# Patient Record
Sex: FEMALE | Race: WHITE | Employment: FULL TIME | URBAN - METROPOLITAN AREA
[De-identification: names, ages, dates, MRNs, and addresses within clinical notes are randomized per-mention and may not be internally consistent; named-entity substitution may affect disease eponyms.]

---

## 2022-11-01 ENCOUNTER — OFFICE VISIT (OUTPATIENT)
Dept: PRIMARY CARE CLINIC | Age: 62
End: 2022-11-01
Payer: COMMERCIAL

## 2022-11-01 VITALS
WEIGHT: 135.8 LBS | OXYGEN SATURATION: 98 % | SYSTOLIC BLOOD PRESSURE: 110 MMHG | HEART RATE: 77 BPM | DIASTOLIC BLOOD PRESSURE: 72 MMHG | TEMPERATURE: 98.7 F

## 2022-11-01 DIAGNOSIS — H61.22 IMPACTED CERUMEN OF LEFT EAR: Primary | ICD-10-CM

## 2022-11-01 PROCEDURE — 99213 OFFICE O/P EST LOW 20 MIN: CPT | Performed by: NURSE PRACTITIONER

## 2022-11-01 ASSESSMENT — PATIENT HEALTH QUESTIONNAIRE - PHQ9
SUM OF ALL RESPONSES TO PHQ QUESTIONS 1-9: 0
SUM OF ALL RESPONSES TO PHQ QUESTIONS 1-9: 0
2. FEELING DOWN, DEPRESSED OR HOPELESS: 0
SUM OF ALL RESPONSES TO PHQ QUESTIONS 1-9: 0
SUM OF ALL RESPONSES TO PHQ QUESTIONS 1-9: 0
SUM OF ALL RESPONSES TO PHQ9 QUESTIONS 1 & 2: 0
1. LITTLE INTEREST OR PLEASURE IN DOING THINGS: 0

## 2022-11-01 NOTE — PROGRESS NOTES
Chief Complaint:   Otalgia (Left ear pain, feels like fluid in ear x 2 weeks )      History of Present Illness   Source of history provided by:  patient. Miles Amaro is a 60 y.o. old female presenting to the [facility] for evaluation of plugged sensation in the left ear x 2 weeks. Denies having nasal congestion, rhinorrhea or sore throat. Denies any discharge from the ear canal. Has tried taking Nyquil OTC, without relief. Denies any fever, chills, CP, SOB, abdominal pain, neck stiffness, rash, or lethargy. Review of Systems   Unless otherwise stated in this report or unable to obtain because of the patient's clinical or mental status as evidenced by the medical record, this patients's positive and negative responses for Review of Systems, constitutional, psych, eyes, ENT, cardiovascular, respiratory, gastrointestinal, neurological, genitourinary, musculoskeletal, integument systems and systems related to the presenting problem are either stated in the preceding or were not pertinent or were negative for the symptoms and/or complaints related to the medical problem. Past Medical History:  has no past medical history on file. Past Surgical History:  has no past surgical history on file. Social History:  reports that she has never smoked. She has never used smokeless tobacco.  Family History: family history is not on file. Allergies: Patient has no known allergies. Physical Exam   Vital Signs:  /72 (Site: Left Upper Arm, Position: Sitting, Cuff Size: Large Adult)   Pulse 77   Temp 98.7 °F (37.1 °C) (Temporal)   Wt 135 lb 12.8 oz (61.6 kg)   SpO2 98%    Oxygen Saturation Interpretation: Normal.    Constitutional:  Alert, development consistent with age. Ears:  External Ears: Normal pinna bilaterally. TM's & External Canals: Right TM and canal are unremarkable. Left TM blocked with cerumen. Nose:  Minimal congestion of the nasal mucosa.   Throat:  Posterior pharynx without injection, exudate, or tonsillar hypertrophy. Airway patient. Neck:  Normal ROM. Supple. No adenopathy. Respiratory:  CTAB without wheezing, rales, or rhonchi  CV: Regular rate and rhythm, normal heart sounds, without pathological murmurs, ectopy, gallops, or rubs. Skin:  Moist and warm without rashes or lesions. Lymphatic: No lymphangitis or adenopathy noted. Neurological:  Oriented. Motor functions intact. Test Results Section   (All laboratory and radiology results have been personally reviewed by myself)  Labs:  No results found for this visit on 11/01/22. Assessment / Plan     Impression(s):  Dru Jaramillo was seen today for otalgia. Diagnoses and all orders for this visit:    Impacted cerumen of left ear  -     CO REMOVAL IMPACTED CERUMEN IRRIGATION/LVG UNILAT  -     carbamide peroxide (DEBROX) 6.5 % otic solution; Place 5 drops in ear(s) 2 times daily      Increase fluids and rest. Additional symptomatic relief discussed. F/u PCP in 5-7 days if symptoms persist. ED sooner if symptoms worsen or change. ED immediately with fever, worsening ear pain, CP, dyspnea, or dysphagia. Pt is in agreement with this care plan. All questions answered. Return if symptoms worsen or fail to improve.     OMAR Posey - MARC

## 2023-12-27 ENCOUNTER — OFFICE VISIT (OUTPATIENT)
Dept: FAMILY MEDICINE CLINIC | Age: 63
End: 2023-12-27
Payer: COMMERCIAL

## 2023-12-27 VITALS
RESPIRATION RATE: 18 BRPM | DIASTOLIC BLOOD PRESSURE: 76 MMHG | HEART RATE: 85 BPM | OXYGEN SATURATION: 96 % | TEMPERATURE: 97.6 F | WEIGHT: 134 LBS | SYSTOLIC BLOOD PRESSURE: 118 MMHG

## 2023-12-27 DIAGNOSIS — J01.10 ACUTE NON-RECURRENT FRONTAL SINUSITIS: Primary | ICD-10-CM

## 2023-12-27 PROCEDURE — 99213 OFFICE O/P EST LOW 20 MIN: CPT | Performed by: NURSE PRACTITIONER

## 2023-12-27 RX ORDER — BENZONATATE 100 MG/1
100 CAPSULE ORAL 3 TIMES DAILY PRN
Qty: 30 CAPSULE | Refills: 0 | Status: SHIPPED | OUTPATIENT
Start: 2023-12-27 | End: 2024-01-06

## 2023-12-27 RX ORDER — AMOXICILLIN 875 MG/1
875 TABLET, COATED ORAL 2 TIMES DAILY
Qty: 14 TABLET | Refills: 0 | Status: SHIPPED | OUTPATIENT
Start: 2023-12-27 | End: 2024-01-03

## 2025-05-30 ENCOUNTER — OFFICE VISIT (OUTPATIENT)
Dept: PRIMARY CARE CLINIC | Age: 65
End: 2025-05-30
Payer: MEDICARE

## 2025-05-30 VITALS
BODY MASS INDEX: 26.06 KG/M2 | DIASTOLIC BLOOD PRESSURE: 88 MMHG | SYSTOLIC BLOOD PRESSURE: 116 MMHG | TEMPERATURE: 97.9 F | HEART RATE: 90 BPM | OXYGEN SATURATION: 97 % | HEIGHT: 62 IN | RESPIRATION RATE: 16 BRPM | WEIGHT: 141.6 LBS

## 2025-05-30 DIAGNOSIS — L60.0 INGROWN NAIL OF GREAT TOE OF RIGHT FOOT: ICD-10-CM

## 2025-05-30 DIAGNOSIS — M25.561 ARTHRALGIA OF RIGHT KNEE: ICD-10-CM

## 2025-05-30 DIAGNOSIS — L03.031 PARONYCHIA OF GREAT TOE, RIGHT: Primary | ICD-10-CM

## 2025-05-30 PROCEDURE — 99214 OFFICE O/P EST MOD 30 MIN: CPT | Performed by: EMERGENCY MEDICINE

## 2025-05-30 PROCEDURE — 1123F ACP DISCUSS/DSCN MKR DOCD: CPT | Performed by: EMERGENCY MEDICINE

## 2025-05-30 RX ORDER — CEPHALEXIN 500 MG/1
500 CAPSULE ORAL 2 TIMES DAILY
Qty: 20 CAPSULE | Refills: 0 | Status: SHIPPED | OUTPATIENT
Start: 2025-05-30 | End: 2025-06-09

## 2025-05-30 SDOH — ECONOMIC STABILITY: FOOD INSECURITY: WITHIN THE PAST 12 MONTHS, THE FOOD YOU BOUGHT JUST DIDN'T LAST AND YOU DIDN'T HAVE MONEY TO GET MORE.: NEVER TRUE

## 2025-05-30 SDOH — ECONOMIC STABILITY: FOOD INSECURITY: WITHIN THE PAST 12 MONTHS, YOU WORRIED THAT YOUR FOOD WOULD RUN OUT BEFORE YOU GOT MONEY TO BUY MORE.: NEVER TRUE

## 2025-05-30 ASSESSMENT — PATIENT HEALTH QUESTIONNAIRE - PHQ9
SUM OF ALL RESPONSES TO PHQ QUESTIONS 1-9: 0
1. LITTLE INTEREST OR PLEASURE IN DOING THINGS: NOT AT ALL
2. FEELING DOWN, DEPRESSED OR HOPELESS: NOT AT ALL
SUM OF ALL RESPONSES TO PHQ QUESTIONS 1-9: 0

## 2025-05-30 ASSESSMENT — ENCOUNTER SYMPTOMS
BACK PAIN: 0
EYE PAIN: 0
DIARRHEA: 0
VOMITING: 0
SORE THROAT: 0
EYE DISCHARGE: 0
ABDOMINAL DISTENTION: 0
SHORTNESS OF BREATH: 0
WHEEZING: 0
NAUSEA: 0
SINUS PRESSURE: 0
COUGH: 0
EYE REDNESS: 0

## 2025-05-30 NOTE — PROGRESS NOTES
Chief Complaint:   Other (Right foot ingrown toe nail and foot red and swollen)      History of Present Illness   HPI:  June Amaro is a 64 y.o. female who presents to [facility] today for \R GR TOE pain, swelling and redness onset 1 week    R knee pain, stands all day cooking for Infectious; [place], [place]    Prior to Visit Medications    Medication Sig Taking? Authorizing Provider   cephALEXin (KEFLEX) 500 MG capsule Take 1 capsule by mouth 2 times daily for 10 days Yes Timmy Su,        Review of Systems   Review of Systems   Constitutional:  Positive for activity change. Negative for chills and fever.   HENT:  Negative for ear pain, sinus pressure and sore throat.    Eyes:  Negative for pain, discharge and redness.   Respiratory:  Negative for cough, shortness of breath and wheezing.    Cardiovascular:  Negative for chest pain.   Gastrointestinal:  Negative for abdominal distention, diarrhea, nausea and vomiting.   Genitourinary:  Negative for dysuria and frequency.   Musculoskeletal:  Positive for arthralgias, gait problem and joint swelling. Negative for back pain.   Skin:  Positive for wound. Negative for rash.   Neurological:  Negative for weakness and headaches.   Hematological:  Negative for adenopathy.   Psychiatric/Behavioral: Negative.     All other systems reviewed and are negative.      Patient's medical, social, and family history reviewed    Past Medical History:  has no past medical history on file.   Past Surgical History:  has no past surgical history on file.  Social History:  reports that she has never smoked. She has never used smokeless tobacco.  Family History: family history is not on file.  Allergies: Patient has no known allergies.    Physical Exam   Vital Signs:  /88 (BP Site: Right Upper Arm, Patient Position: Sitting, BP Cuff Size: Large Adult)   Pulse 90   Temp 97.9 °F (36.6 °C) (Temporal)   Resp 16   Ht 1.575 m (5' 2\")   Wt 64.2 kg (141 lb 9.6 oz)   SpO2 97%

## 2025-06-13 ENCOUNTER — RESULTS FOLLOW-UP (OUTPATIENT)
Dept: FAMILY MEDICINE CLINIC | Age: 65
End: 2025-06-13

## 2025-06-13 ENCOUNTER — TELEPHONE (OUTPATIENT)
Dept: PRIMARY CARE CLINIC | Age: 65
End: 2025-06-13

## 2025-06-13 DIAGNOSIS — M17.11 OSTEOARTHRITIS OF RIGHT KNEE, UNSPECIFIED OSTEOARTHRITIS TYPE: Primary | ICD-10-CM

## 2025-06-13 NOTE — TELEPHONE ENCOUNTER
----- Message from Roosevelt QUESADA sent at 6/13/2025 11:02 AM EDT -----  Regarding: ECC Message to Provider  ECC Message to Provider    Relationship to Patient: Self     Additional Information The patient had an Xray at [facility] and would like to know if you received the result. And also she ask if she needs to go to the office to get the result.  --------------------------------------------------------------------------------------------------------------------------    Call Back Information: OK to leave message on voicemail  Preferred Call Back Number: Phone [phone]

## 2025-06-16 SDOH — HEALTH STABILITY: PHYSICAL HEALTH: ON AVERAGE, HOW MANY DAYS PER WEEK DO YOU ENGAGE IN MODERATE TO STRENUOUS EXERCISE (LIKE A BRISK WALK)?: 0 DAYS

## 2025-06-16 SDOH — HEALTH STABILITY: PHYSICAL HEALTH: ON AVERAGE, HOW MANY MINUTES DO YOU ENGAGE IN EXERCISE AT THIS LEVEL?: 0 MIN

## 2025-06-17 ENCOUNTER — OFFICE VISIT (OUTPATIENT)
Dept: PRIMARY CARE CLINIC | Age: 65
End: 2025-06-17
Payer: MEDICARE

## 2025-06-17 VITALS
SYSTOLIC BLOOD PRESSURE: 112 MMHG | HEART RATE: 76 BPM | HEIGHT: 62 IN | DIASTOLIC BLOOD PRESSURE: 70 MMHG | BODY MASS INDEX: 25.76 KG/M2 | WEIGHT: 140 LBS | OXYGEN SATURATION: 97 % | TEMPERATURE: 97.5 F

## 2025-06-17 DIAGNOSIS — R42 DIZZINESS: ICD-10-CM

## 2025-06-17 DIAGNOSIS — Z13.220 LIPID SCREENING: ICD-10-CM

## 2025-06-17 DIAGNOSIS — Z13.21 ENCOUNTER FOR VITAMIN DEFICIENCY SCREENING: ICD-10-CM

## 2025-06-17 DIAGNOSIS — Z13.29 THYROID DISORDER SCREEN: ICD-10-CM

## 2025-06-17 DIAGNOSIS — R53.83 TIRED: ICD-10-CM

## 2025-06-17 DIAGNOSIS — M25.561 ACUTE PAIN OF RIGHT KNEE: Primary | ICD-10-CM

## 2025-06-17 DIAGNOSIS — Z13.1 DIABETES MELLITUS SCREENING: ICD-10-CM

## 2025-06-17 PROCEDURE — 1123F ACP DISCUSS/DSCN MKR DOCD: CPT | Performed by: NURSE PRACTITIONER

## 2025-06-17 PROCEDURE — 99214 OFFICE O/P EST MOD 30 MIN: CPT | Performed by: NURSE PRACTITIONER

## 2025-06-17 ASSESSMENT — PATIENT HEALTH QUESTIONNAIRE - PHQ9
SUM OF ALL RESPONSES TO PHQ QUESTIONS 1-9: 0
2. FEELING DOWN, DEPRESSED OR HOPELESS: NOT AT ALL
1. LITTLE INTEREST OR PLEASURE IN DOING THINGS: NOT AT ALL
SUM OF ALL RESPONSES TO PHQ QUESTIONS 1-9: 0

## 2025-06-17 ASSESSMENT — LIFESTYLE VARIABLES
HOW OFTEN DO YOU HAVE A DRINK CONTAINING ALCOHOL: NEVER
HOW MANY STANDARD DRINKS CONTAINING ALCOHOL DO YOU HAVE ON A TYPICAL DAY: PATIENT DOES NOT DRINK

## 2025-06-17 NOTE — PROGRESS NOTES
25  June Close : 1960 Sex: female  Age: 64 y.o.    Chief Complaint   Patient presents with    New Patient     Just establishing new care.     Follow-up     Ingrown toenail    Knee Pain     [facility] malissa. Does have arthritis.     Fatigue     \"Tired a lot\" Might be normal. Works at 5 in the a.m. till noon and that might be why. Does take green tea tablets, that might bring sugar down too low.        History of Present Illness  The patient is a 64-year-old female who presents for evaluation of knee arthritis, fatigue, and dizziness.    She was diagnosed with arthritis in her knee following an x-ray at [facility] last week. She experiences pain when sitting, but not during movement. A slight limp has been observed by others. Her current treatment regimen includes ibuprofen in the morning, Tylenol at night, and Aspercreme application before sleep. Despite these measures, she often wakes up around 2:00 AM due to pain. She has not tried any other treatments for her condition. She expresses a preference for topical treatments over oral medications.    She reports persistent fatigue and requests a blood test to check for potential anemia or diabetes. She has a family history of diabetes in her grandfather and arthritis in her father. She is not interested in having a DEXA scan done. She has never had a Pap smear and does not engage in preventative health maintenance. She is not currently taking any medications. She had her ears cleaned by Dr. Hernandez approximately 6 months ago due to frequent wax buildup. She has always had puffy ankles, which she attributes to standing on her feet all day at work.    She also experiences dizziness, which has led to three emergency room visits over the past 20 years. She sees Dr. Garcia, a chiropractor, every 3 months and receives a monthly massage for her back and neck. She has been given exercises to perform by Raissa Fan, a physical therapist. During

## 2025-06-19 ENCOUNTER — LAB (OUTPATIENT)
Dept: PRIMARY CARE CLINIC | Age: 65
End: 2025-06-19

## 2025-06-19 DIAGNOSIS — L03.031 PARONYCHIA OF GREAT TOE, RIGHT: Primary | ICD-10-CM

## 2025-06-19 DIAGNOSIS — R53.83 TIRED: ICD-10-CM

## 2025-06-19 DIAGNOSIS — Z13.1 DIABETES MELLITUS SCREENING: ICD-10-CM

## 2025-06-19 DIAGNOSIS — Z13.220 LIPID SCREENING: ICD-10-CM

## 2025-06-19 DIAGNOSIS — Z13.29 THYROID DISORDER SCREEN: ICD-10-CM

## 2025-06-19 DIAGNOSIS — Z13.21 ENCOUNTER FOR VITAMIN DEFICIENCY SCREENING: ICD-10-CM

## 2025-06-19 LAB
ALBUMIN: 4.3 G/DL (ref 3.5–5.2)
ALP BLD-CCNC: 76 U/L (ref 35–104)
ALT SERPL-CCNC: 17 U/L (ref 0–35)
ANION GAP SERPL CALCULATED.3IONS-SCNC: 11 MMOL/L (ref 7–16)
AST SERPL-CCNC: 26 U/L (ref 0–35)
BASOPHILS ABSOLUTE: 0.04 K/UL (ref 0–0.2)
BASOPHILS RELATIVE PERCENT: 1 % (ref 0–2)
BILIRUB SERPL-MCNC: 0.4 MG/DL (ref 0–1.2)
BUN BLDV-MCNC: 21 MG/DL (ref 8–23)
CALCIUM SERPL-MCNC: 9.1 MG/DL (ref 8.8–10.2)
CHLORIDE BLD-SCNC: 107 MMOL/L (ref 98–107)
CHOLESTEROL, TOTAL: 329 MG/DL
CO2: 25 MMOL/L (ref 22–29)
CREAT SERPL-MCNC: 0.7 MG/DL (ref 0.5–1)
EOSINOPHILS ABSOLUTE: 0.17 K/UL (ref 0.05–0.5)
EOSINOPHILS RELATIVE PERCENT: 4 % (ref 0–6)
FOLATE: 31.4 NG/ML (ref 4.6–34.8)
GFR, ESTIMATED: >90 ML/MIN/1.73M2
GLUCOSE FASTING: 86 MG/DL (ref 74–99)
HBA1C MFR BLD: 5.7 % (ref 4–5.6)
HCT VFR BLD CALC: 38.4 % (ref 34–48)
HDLC SERPL-MCNC: 74 MG/DL
HEMOGLOBIN: 12.8 G/DL (ref 11.5–15.5)
IMMATURE GRANULOCYTES %: 0 % (ref 0–5)
IMMATURE GRANULOCYTES ABSOLUTE: <0.03 K/UL (ref 0–0.58)
IRON: 90 UG/DL (ref 37–145)
LDL CHOLESTEROL: 236 MG/DL
LYMPHOCYTES ABSOLUTE: 1.89 K/UL (ref 1.5–4)
LYMPHOCYTES RELATIVE PERCENT: 42 % (ref 20–42)
MCH RBC QN AUTO: 31.4 PG (ref 26–35)
MCHC RBC AUTO-ENTMCNC: 33.3 G/DL (ref 32–34.5)
MCV RBC AUTO: 94.1 FL (ref 80–99.9)
MONOCYTES ABSOLUTE: 0.34 K/UL (ref 0.1–0.95)
MONOCYTES RELATIVE PERCENT: 8 % (ref 2–12)
NEUTROPHILS ABSOLUTE: 2.1 K/UL (ref 1.8–7.3)
NEUTROPHILS RELATIVE PERCENT: 46 % (ref 43–80)
PDW BLD-RTO: 14.1 % (ref 11.5–15)
PLATELET # BLD: 292 K/UL (ref 130–450)
PMV BLD AUTO: 9.7 FL (ref 7–12)
POTASSIUM SERPL-SCNC: 4.2 MMOL/L (ref 3.5–5.1)
RBC # BLD: 4.08 M/UL (ref 3.5–5.5)
SODIUM BLD-SCNC: 143 MMOL/L (ref 136–145)
TOTAL PROTEIN: 7 G/DL (ref 6.4–8.3)
TRIGL SERPL-MCNC: 95 MG/DL
TSH SERPL DL<=0.05 MIU/L-ACNC: 1.45 UIU/ML (ref 0.27–4.2)
VITAMIN B-12: 473 PG/ML (ref 232–1245)
VITAMIN D 25-HYDROXY: 37.6 NG/ML (ref 30–100)
VLDLC SERPL CALC-MCNC: 19 MG/DL
WBC # BLD: 4.5 K/UL (ref 4.5–11.5)

## 2025-07-01 ENCOUNTER — RESULTS FOLLOW-UP (OUTPATIENT)
Dept: FAMILY MEDICINE CLINIC | Age: 65
End: 2025-07-01